# Patient Record
Sex: MALE | Race: WHITE | NOT HISPANIC OR LATINO | URBAN - METROPOLITAN AREA
[De-identification: names, ages, dates, MRNs, and addresses within clinical notes are randomized per-mention and may not be internally consistent; named-entity substitution may affect disease eponyms.]

---

## 2023-04-28 ENCOUNTER — EMERGENCY (EMERGENCY)
Facility: HOSPITAL | Age: 33
LOS: 1 days | Discharge: HOME | End: 2023-04-28
Attending: EMERGENCY MEDICINE | Admitting: EMERGENCY MEDICINE
Payer: COMMERCIAL

## 2023-04-28 VITALS
RESPIRATION RATE: 18 BRPM | HEART RATE: 91 BPM | OXYGEN SATURATION: 98 % | DIASTOLIC BLOOD PRESSURE: 80 MMHG | TEMPERATURE: 98 F | SYSTOLIC BLOOD PRESSURE: 122 MMHG

## 2023-04-28 PROCEDURE — 99284 EMERGENCY DEPT VISIT MOD MDM: CPT

## 2023-04-28 RX ORDER — METOCLOPRAMIDE HCL 10 MG
10 TABLET ORAL ONCE
Refills: 0 | Status: COMPLETED | OUTPATIENT
Start: 2023-04-28 | End: 2023-04-28

## 2023-04-28 RX ORDER — KETOROLAC TROMETHAMINE 30 MG/ML
15 SYRINGE (ML) INJECTION ONCE
Refills: 0 | Status: DISCONTINUED | OUTPATIENT
Start: 2023-04-28 | End: 2023-04-28

## 2023-04-28 RX ORDER — SODIUM CHLORIDE 9 MG/ML
1000 INJECTION INTRAMUSCULAR; INTRAVENOUS; SUBCUTANEOUS ONCE
Refills: 0 | Status: COMPLETED | OUTPATIENT
Start: 2023-04-28 | End: 2023-04-28

## 2023-04-28 RX ORDER — DIPHENHYDRAMINE HCL 50 MG
50 CAPSULE ORAL ONCE
Refills: 0 | Status: COMPLETED | OUTPATIENT
Start: 2023-04-28 | End: 2023-04-28

## 2023-04-28 RX ADMIN — Medication 10 MILLIGRAM(S): at 05:37

## 2023-04-28 RX ADMIN — Medication 50 MILLIGRAM(S): at 05:37

## 2023-04-28 RX ADMIN — SODIUM CHLORIDE 1000 MILLILITER(S): 9 INJECTION INTRAMUSCULAR; INTRAVENOUS; SUBCUTANEOUS at 05:38

## 2023-04-28 RX ADMIN — Medication 15 MILLIGRAM(S): at 05:37

## 2023-04-28 NOTE — ED PROVIDER NOTE - PATIENT PORTAL LINK FT
You can access the FollowMyHealth Patient Portal offered by [facility] by registering at the following website: [email or web address]. By joining [employer]’s FollowMyHealth portal, you will also be able to view your health information using other applications (apps) compatible with our system.

## 2023-04-28 NOTE — ED ADULT NURSE NOTE - OBJECTIVE STATEMENT
32 y/o M here with intense headache upon waking in the middle of sleep tonight. Hx migraine h/a. States he took his usual sandra without improvement. Patient is A&Ox3 ambulatory. NKA.

## 2023-04-28 NOTE — ED PROVIDER NOTE - OBJECTIVE STATEMENT
33-year-old male history of migraines presents with migraine-like headache that began after watching TV.  Patient developed his typical aura of visual disturbances but did not take anything to abort the migraine onset, shortly after he developed right-sided temporal nonradiating severe dull pain associated with nausea; denies vomiting, fevers, motor/sensory changes, visual disturbance.  Took a diclofenac approximately 2 hours ago without relief of symptoms.  States that he had a high salt diet for the past couple days and thinks he may be dehydrated.  Denies recent illness, new medications, difficulty sleeping.  Patient is sober so no alcohol or drugs recently.

## 2023-04-28 NOTE — ED PROVIDER NOTE - NSFOLLOWUPINSTRUCTIONS_ED_ALL_ED_FT
Migraine Headache  A migraine headache is an intense, throbbing pain on one side or both sides of the head. Migraines may also cause other symptoms, such as nausea, vomiting, and sensitivity to light and noise.    What are the causes?  Doing or taking certain things may also trigger migraines, such as:  Alcohol.  Smoking.    Medicines, such as:  Medicine used to treat chest pain (nitroglycerine).  Birth control pills.  Estrogen pills.  Certain blood pressure medicines.  Aged cheeses, chocolate, or caffeine.  Foods or drinks that contain nitrates, glutamate, aspartame, or tyramine.  Physical activity.    Other things that may trigger a migraine include:  Menstruation.  Pregnancy.  Hunger.  Stress, lack of sleep, too much sleep, or fatigue.  Weather changes.    What increases the risk?  The following factors may make you more likely to experience migraine headaches:  Age. Risk increases with age.  Family history of migraine headaches.  Being .  Depression and anxiety.  Obesity.  Being a woman.  Having a hole in the heart (patent foramen ovale) or other heart problems.    What are the signs or symptoms?  The main symptom of this condition is pulsating or throbbing pain. Pain may:  Happen in any area of the head, such as on one side or both sides.  Interfere with daily activities.  Get worse with physical activity.  Get worse with exposure to bright lights or loud noises.    Other symptoms may include:  Nausea.  Vomiting.  Dizziness.  General sensitivity to bright lights, loud noises, or smells.    Before you get a migraine, you may get warning signs that a migraine is developing (aura). An aura may include:  Seeing flashing lights or having blind spots.  Seeing bright spots, halos, or zigzag lines.  Having tunnel vision or blurred vision.  Having numbness or a tingling feeling.  Having trouble talking.  Having muscle weakness.    How is this diagnosed?  A migraine headache can be diagnosed based on:  Your symptoms.  A physical exam.  Tests, such as CT scan or MRI of the head. These imaging tests can help rule out other causes of headaches.  Taking fluid from the spine (lumbar puncture) and analyzing it (cerebrospinal fluid analysis, or CSF analysis).    How is this treated?  A migraine headache is usually treated with medicines that:  Relieve pain.  Relieve nausea.  Prevent migraines from coming back.    Treatment may also include:  Acupuncture.  Lifestyle changes like avoiding foods that trigger migraines.    Follow these instructions at home:  Medicines     Take over-the-counter and prescription medicines only as told by your health care provider.  Do not drive or use heavy machinery while taking prescription pain medicine.  To prevent or treat constipation while you are taking prescription pain medicine, your health care provider may recommend that you:    Drink enough fluid to keep your urine clear or pale yellow.  Take over-the-counter or prescription medicines.  Eat foods that are high in fiber, such as fresh fruits and vegetables, whole grains, and beans.  Limit foods that are high in fat and processed sugars, such as fried and sweet foods.    Lifestyle     Avoid alcohol use.  Do not use any products that contain nicotine or tobacco, such as cigarettes and e-cigarettes. If you need help quitting, ask your health care provider.  Get at least 8 hours of sleep every night.  Limit your stress.    General instructions     Keep a journal to find out what may trigger your migraine headaches. For example, write down:  What you eat and drink.  How much sleep you get.  Any change to your diet or medicines.    If you have a migraine:  Avoid things that make your symptoms worse, such as bright lights.  It may help to lie down in a dark, quiet room.  Do not drive or use heavy machinery.  Ask your health care provider what activities are safe for you while you are experiencing symptoms.    Keep all follow-up visits as told by your health care provider. This is important.    Contact a health care provider if:  You develop symptoms that are different or more severe than your usual migraine symptoms.    Get help right away if:  Your migraine becomes severe.  You have a fever.  You have a stiff neck.  You have vision loss.  Your muscles feel weak or like you cannot control them.  You start to lose your balance often.  You develop trouble walking.  You faint.  This information is not intended to replace advice given to you by your health care provider. Make sure you discuss any questions you have with your health care provider. Migraine Headache  A migraine headache is an intense, throbbing pain on one side or both sides of the head. Migraines may also cause other symptoms, such as nausea, vomiting, and sensitivity to light and noise.    What are the causes?  Doing or taking certain things may also trigger migraines, such as:  Alcohol.  Smoking.    Medicines, such as:  Medicine used to treat chest pain (nitroglycerine).  Birth control pills.  Estrogen pills.  Certain blood pressure medicines.  Aged cheeses, chocolate, or caffeine.  Foods or drinks that contain nitrates, glutamate, aspartame, or tyramine.  Physical activity.    Other things that may trigger a migraine include:  Menstruation.  Pregnancy.  Hunger.  Stress, lack of sleep, too much sleep, or fatigue.  Weather changes.    What increases the risk?  The following factors may make you more likely to experience migraine headaches:  Age. Risk increases with age.  Family history of migraine headaches.  Being .  Depression and anxiety.  Obesity.  Being a woman.  Having a hole in the heart (patent foramen ovale) or other heart problems.    What are the signs or symptoms?  The main symptom of this condition is pulsating or throbbing pain. Pain may:  Happen in any area of the head, such as on one side or both sides.  Interfere with daily activities.  Get worse with physical activity.  Get worse with exposure to bright lights or loud noises.    Other symptoms may include:  Nausea.  Vomiting.  Dizziness.  General sensitivity to bright lights, loud noises, or smells.    Before you get a migraine, you may get warning signs that a migraine is developing (aura). An aura may include:  Seeing flashing lights or having blind spots.  Seeing bright spots, halos, or zigzag lines.  Having tunnel vision or blurred vision.  Having numbness or a tingling feeling.  Having trouble talking.  Having muscle weakness.    How is this diagnosed?  A migraine headache can be diagnosed based on:  Your symptoms.  A physical exam.  Tests, such as CT scan or MRI of the head. These imaging tests can help rule out other causes of headaches.  Taking fluid from the spine (lumbar puncture) and analyzing it (cerebrospinal fluid analysis, or CSF analysis).    How is this treated?  A migraine headache is usually treated with medicines that:  Relieve pain.  Relieve nausea.  Prevent migraines from coming back.    Treatment may also include:  Acupuncture.  Lifestyle changes like avoiding foods that trigger migraines.    Follow these instructions at home:  Medicines     Take over-the-counter and prescription medicines only as told by your health care provider.  Do not drive or use heavy machinery while taking prescription pain medicine.  To prevent or treat constipation while you are taking prescription pain medicine, your health care provider may recommend that you:    Drink enough fluid to keep your urine clear or pale yellow.  Take over-the-counter or prescription medicines.  Eat foods that are high in fiber, such as fresh fruits and vegetables, whole grains, and beans.  Limit foods that are high in fat and processed sugars, such as fried and sweet foods.    Lifestyle     Avoid alcohol use.  Do not use any products that contain nicotine or tobacco, such as cigarettes and e-cigarettes. If you need help quitting, ask your health care provider.  Get at least 8 hours of sleep every night.  Limit your stress.    General instructions     Keep a journal to find out what may trigger your migraine headaches. For example, write down:  What you eat and drink.  How much sleep you get.  Any change to your diet or medicines.    If you have a migraine:  Avoid things that make your symptoms worse, such as bright lights.  It may help to lie down in a dark, quiet room.  Do not drive or use heavy machinery.  Ask your health care provider what activities are safe for you while you are experiencing symptoms.    Keep all follow-up visits as told by your health care provider. This is important.    Contact a health care provider if:  You develop symptoms that are different or more severe than your usual migraine symptoms.    Get help right away if:  Your migraine becomes severe.  You have a fever.  You have a stiff neck.  You have vision loss.  Your muscles feel weak or like you cannot control them.  You start to lose your balance often.  You develop trouble walking.  You faint.  This information is not intended to replace advice given to you by your health care provider. Make sure you discuss any questions you have with your health care provider.      You may take ibuprofen (e.g. Advil, Motrin) 600mg every 6 hours and/or acetaminophen (e.g. Tylenol) 650mg every 4-6 hours as needed for pain control.

## 2023-04-28 NOTE — ED ADULT TRIAGE NOTE - CHIEF COMPLAINT QUOTE
Walk in pt with complaints of intense headache upon waking in the middle of sleep tonight. Hx migraine h/a. States he took his usual sandra without improvement.

## 2023-04-28 NOTE — ED PROVIDER NOTE - CLINICAL SUMMARY MEDICAL DECISION MAKING FREE TEXT BOX
33-year-old male history of migraines presents with migraine-like headache that began after watching TV.  Patient developed his typical aura of visual disturbances but did not take anything to abort the migraine onset, shortly after he developed right-sided temporal nonradiating severe dull pain associated with nausea; denies vomiting, fevers, motor/sensory changes, visual disturbance.  Took a diclofenac approximately 2 hours ago without relief of symptoms.  States that he had a high salt diet for the past couple days and thinks he may be dehydrated.  Denies recent illness, new medications, difficulty sleeping.  Patient is sober so no alcohol or drugs recently.    On exam, patient has eyes covered but is well-appearing.  Pupils equal round reactive to light, extraocular movements intact, no visual field disturbance.  Motor/sensation intact in all 4 extremities.  Finger-to-nose intact.  Nonlabored respirations.    Patient presents with symptoms of migraine headache that were gradual in onset and preceded by his typical auras.  Do not suspect intracranial hemorrhage/subarachnoid hemorrhage/meningitis as a cause of his pain as he did not have sudden onset nor does he have fevers or meningismus.  Will give migraine cocktail and reassess patient.  No indication for labs at this time.

## 2023-04-28 NOTE — ED ADULT TRIAGE NOTE - CODE STROKE ACTIVE YN
Surgical Progress Note    Author: MICHAEL Kaur Date & Time created: 2022   2:16 PM     Interval Events:  S/p Video mediastinoscopy with node biopsy, Right thoracoscopy with upper lobectomy, and  Mediastinal lymphadenectomy - POD#1, doing well; yelitza po; pain controlled; using IS; ambulatory.    Review of Systems   Constitutional:  Negative for chills and fever.   Respiratory:  Positive for cough. Negative for shortness of breath.         + incisional/surgical pain   Skin:  Negative for itching and rash.   Hemodynamics:  Temp (24hrs), Av.6 °C (97.9 °F), Min:36 °C (96.8 °F), Max:37.4 °C (99.4 °F)  Temperature: 36.8 °C (98.2 °F)  Pulse  Av.3  Min: 61  Max: 74   Blood Pressure : (!) 146/82     Respiratory:    Respiration: 18, Pulse Oximetry: 95 %     Work Of Breathing / Effort: Mild  RUL Breath Sounds: Diminished, RML Breath Sounds: Diminished, RLL Breath Sounds: Clear, RIOS Breath Sounds: Clear, LLL Breath Sounds: Clear  Neuro:  GCS       Fluids:    Intake/Output Summary (Last 24 hours) at 2022 1416  Last data filed at 2022 1126  Gross per 24 hour   Intake 1220 ml   Output 134 ml   Net 1086 ml        Current Diet Order   Procedures    Diet Order Diet: Regular     Physical Exam  Vitals and nursing note reviewed.   Constitutional:       General: She is not in acute distress.  Cardiovascular:      Rate and Rhythm: Normal rate.   Pulmonary:      Effort: Pulmonary effort is normal. No respiratory distress.      Comments: Right chest tube in place, ~ 90ml serosang output/24 hrs  No airleak seen  Wounds c/d/i   No ptx on CXR  Abdominal:      Palpations: Abdomen is soft.   Skin:     General: Skin is warm and dry.   Neurological:      Mental Status: She is alert and oriented to person, place, and time.   Psychiatric:         Mood and Affect: Mood normal.     Labs:  Recent Results (from the past 24 hour(s))   CBC without Differential    Collection Time: 22  1:26 AM   Result Value Ref Range     WBC 9.6 4.8 - 10.8 K/uL    RBC 4.00 (L) 4.20 - 5.40 M/uL    Hemoglobin 12.6 12.0 - 16.0 g/dL    Hematocrit 37.8 37.0 - 47.0 %    MCV 94.5 81.4 - 97.8 fL    MCH 31.5 27.0 - 33.0 pg    MCHC 33.3 (L) 33.6 - 35.0 g/dL    RDW 45.9 35.9 - 50.0 fL    Platelet Count 257 164 - 446 K/uL    MPV 10.3 9.0 - 12.9 fL   Basic Metabolic Panel (BPM)    Collection Time: 09/27/22  1:26 AM   Result Value Ref Range    Sodium 132 (L) 135 - 145 mmol/L    Potassium 4.3 3.6 - 5.5 mmol/L    Chloride 101 96 - 112 mmol/L    Co2 22 20 - 33 mmol/L    Glucose 144 (H) 65 - 99 mg/dL    Bun 9 8 - 22 mg/dL    Creatinine 0.64 0.50 - 1.40 mg/dL    Calcium 8.3 (L) 8.5 - 10.5 mg/dL    Anion Gap 9.0 7.0 - 16.0   ESTIMATED GFR    Collection Time: 09/27/22  1:26 AM   Result Value Ref Range    GFR (CKD-EPI) 92 >60 mL/min/1.73 m 2     Medical Decision Making, by Problem:  Active Hospital Problems    Diagnosis     Primary lung cancer, right (HCC) [C34.91]      Plan:  1. Chest tube discontinued, Tegaderm bandage placed  2. Discharge home in Am tomorrow when alert, comfortable, ambulatory, and tolerating PO well  3. Pt counseled re: diet , activity, wound care, I.S., and home med's  4. May shower tomorrow over Tegaderms.  5. Remove Tegaderms in 4 days.  6. No baths, hot tubs, soaks for 14 days.  7. No lifting >15 lbs for 1-2 wks  8. No driving for 4-5 days   9. F/U with Dr. Ganser in 1-2 weeks  10. Await path.    CALL IF YOU:   (1) fevers greater than 101.0 degrees F;    (2) Unusual chest or leg pain, redness or swelling behind the knee or in the calf muscle;  (3) Drainage or fluid from incision that may be foul smelling, increased tenderness or soreness at the wound or the wound edges are no longer together, redness or swelling at the incision site.    (4) Please do not hesitate to call with any other questions. ([phone]).     Quality Measures:  Quality-Core Measures   Reviewed items::  Labs reviewed, Medications reviewed and Radiology images reviewed  DVT  prophylaxis pharmacological::  Enoxaparin (Lovenox)  DVT prophylaxis - mechanical:  SCDs  Ulcer Prophylaxis::  Yes    Discussed patient condition with RN, Patient, and Dr. Ganser     No

## 2023-04-28 NOTE — ED PROVIDER NOTE - PHYSICAL EXAMINATION
On exam, patient has eyes covered but is well-appearing.  Pupils equal round reactive to light, extraocular movements intact, no visual field disturbance.  Motor/sensation intact in all 4 extremities.  Finger-to-nose intact.  Nonlabored respirations.

## 2023-05-01 DIAGNOSIS — R51.9 HEADACHE, UNSPECIFIED: ICD-10-CM

## 2023-05-01 DIAGNOSIS — G43.909 MIGRAINE, UNSPECIFIED, NOT INTRACTABLE, WITHOUT STATUS MIGRAINOSUS: ICD-10-CM
